# Patient Record
Sex: MALE | Race: WHITE | Employment: OTHER | URBAN - METROPOLITAN AREA
[De-identification: names, ages, dates, MRNs, and addresses within clinical notes are randomized per-mention and may not be internally consistent; named-entity substitution may affect disease eponyms.]

---

## 2017-10-13 PROCEDURE — 36415 COLL VENOUS BLD VENIPUNCTURE: CPT | Performed by: INTERNAL MEDICINE

## 2017-10-13 PROCEDURE — 83516 IMMUNOASSAY NONANTIBODY: CPT | Performed by: INTERNAL MEDICINE

## 2017-10-13 PROCEDURE — 82784 ASSAY IGA/IGD/IGG/IGM EACH: CPT | Performed by: INTERNAL MEDICINE

## 2018-01-10 PROBLEM — M67.40 GANGLION CYST: Status: ACTIVE | Noted: 2018-01-10

## 2018-02-12 PROBLEM — M67.40 GANGLION CYST: Status: RESOLVED | Noted: 2018-01-10 | Resolved: 2018-02-12

## 2020-03-02 PROBLEM — M79.10 MUSCLE SORENESS: Status: ACTIVE | Noted: 2020-03-02

## 2020-04-28 PROBLEM — M79.10 MUSCLE SORENESS: Status: RESOLVED | Noted: 2020-03-02 | Resolved: 2020-04-28

## 2020-04-28 PROBLEM — D69.2 SENILE PURPURA (HCC): Status: ACTIVE | Noted: 2020-04-28

## 2020-05-01 ENCOUNTER — APPOINTMENT (OUTPATIENT)
Dept: RADIATION ONCOLOGY | Facility: HOSPITAL | Age: 75
End: 2020-05-01
Attending: RADIOLOGY
Payer: MEDICARE

## 2020-08-25 PROBLEM — D03.4 MELANOMA IN SITU OF NECK (HCC): Status: ACTIVE | Noted: 2020-08-25

## 2021-02-18 PROBLEM — I42.9 CARDIOMYOPATHY, UNSPECIFIED TYPE (HCC): Status: ACTIVE | Noted: 2021-02-18

## 2021-02-18 PROBLEM — I49.5 SSS (SICK SINUS SYNDROME) (HCC): Status: ACTIVE | Noted: 2021-02-18

## 2021-02-18 PROBLEM — I25.119 CORONARY ARTERY DISEASE INVOLVING NATIVE CORONARY ARTERY OF NATIVE HEART WITH ANGINA PECTORIS (HCC): Status: ACTIVE | Noted: 2021-02-18

## 2021-08-12 PROBLEM — I25.119 CORONARY ARTERY DISEASE INVOLVING NATIVE CORONARY ARTERY OF NATIVE HEART WITH ANGINA PECTORIS (HCC): Status: RESOLVED | Noted: 2021-02-18 | Resolved: 2021-08-12

## 2021-11-10 ENCOUNTER — TELEPHONE (OUTPATIENT)
Dept: SURGERY | Facility: CLINIC | Age: 76
End: 2021-11-10

## 2021-11-10 ENCOUNTER — OFFICE VISIT (OUTPATIENT)
Dept: SURGERY | Facility: CLINIC | Age: 76
End: 2021-11-10
Payer: MEDICARE

## 2021-11-10 VITALS
DIASTOLIC BLOOD PRESSURE: 78 MMHG | WEIGHT: 156 LBS | SYSTOLIC BLOOD PRESSURE: 130 MMHG | HEIGHT: 70 IN | BODY MASS INDEX: 22.33 KG/M2 | HEART RATE: 60 BPM

## 2021-11-10 DIAGNOSIS — C61 PROSTATE CANCER (HCC): Primary | ICD-10-CM

## 2021-11-10 DIAGNOSIS — R79.89 LOW TESTOSTERONE: ICD-10-CM

## 2021-11-10 DIAGNOSIS — R97.21 RISING PSA FOLLOWING TREATMENT FOR MALIGNANT NEOPLASM OF PROSTATE: ICD-10-CM

## 2021-11-10 DIAGNOSIS — R35.1 NOCTURIA: ICD-10-CM

## 2021-11-10 PROCEDURE — 99205 OFFICE O/P NEW HI 60 MIN: CPT | Performed by: UROLOGY

## 2021-11-10 NOTE — PATIENT INSTRUCTIONS
Koki Valadez M.D.      1.  Please get blood draw for PSA, testosterone, and CMP (comprehensive metabolic profile) during the next few days; the CMP requires fasting at least 8 hours, but you may drink water right up

## 2021-11-10 NOTE — PROGRESS NOTES
Rukhsana Shin is a 68year old male. HPI:   Patient presents with:  Prostate Cancer: pt in for consult, pt has prostate cancer. History provided by Patient and wife Pelon Cali.  Self-referred    Prostate cancer   Stage TI C., Austin's score 3+5 adeno of radation. MRI completed on 6/26/2020 showed no evidence of metastatic disease within the pelvis. Last Lupron (1 month, right side) injection given on 10/21/2020. Last Bicalutamide Refill on 10/27/2020.  Last Eligard (22.5 mg, left side) injection given o SURGERY  04/2021    BCC removal back and cheek   • SKIN SURGERY  06/17/2021    BCC-right upper lip near alar crease, MMS   • SPECIAL SERVICE OR REPORT  May 7, 2007    Brachytherapy      Family History   Problem Relation Age of Onset   • Other (smoking) Fat bruising  Integumentary:  Negative for pruritus and rash  Musculoskeletal:  Negative for bone/joint symptoms  Psychiatric:  Negative for inappropriate interaction and psychiatric symptoms  Respiratory:  Negative for cough, dyspnea and wheezing      PHYSICA 93.99  11/18/2020 PSA = 0.650  10/15/2020 PSA = 1.27  09/14/2020 PSA = 6.69  07/09/2020 PSA = 4.68  06/06/2020 PSA = 6.36  03/12/2020 PSA = 4.62  01/23/2020 PSA = 8.40  10/22/2019 PSA = 1.77  07/23/2019 PSA = 0.353  04/22/2019 PSA = 2.86  03/25/2016 PSA = metastatic disease; prostatic radiation seeds. Patient is currently receiving Eligard Injections, Last injection 09/08/2021. Most recent  10/25/2021 PSA = 6.31 slightly decreased to 09/09/2021 PSA = 7.42.  I explain to patient and his wife that he has lik PSA, Diagnostic      Testosterone Total      Comp Metabolic Panel (14)      Meds This Visit:  Requested Prescriptions      No prescriptions requested or ordered in this encounter       Imaging & Referrals:  NM BONE SCAN WB (CPT=78306)  PET WHOLE BODY FOR P

## 2021-11-10 NOTE — TELEPHONE ENCOUNTER
Armando Tripathi,    I am seeing Pushpa Pascal for second opinion on recurrent prostate cancer, status post failed brachytherapy in the past, with rising PSA despite recent leuprolide-Lupron injection. I recommend that he seek treatment opinion from a good medical oncologist--could you refer him to a medical oncologist for an opinion? I am also recommending that he get a PET CT scan and a bone scan and my intentions are to order it through [facility].   Thanks,  Sayra Rice

## 2023-06-05 ENCOUNTER — TELEPHONE (OUTPATIENT)
Dept: HEMATOLOGY/ONCOLOGY | Facility: HOSPITAL | Age: 78
End: 2023-06-05

## 2023-06-13 ENCOUNTER — APPOINTMENT (OUTPATIENT)
Dept: HEMATOLOGY/ONCOLOGY | Facility: HOSPITAL | Age: 78
End: 2023-06-13
Attending: INTERNAL MEDICINE
Payer: MEDICARE

## 2023-06-13 ENCOUNTER — OFFICE VISIT (OUTPATIENT)
Dept: HEMATOLOGY/ONCOLOGY | Facility: HOSPITAL | Age: 78
End: 2023-06-13
Attending: STUDENT IN AN ORGANIZED HEALTH CARE EDUCATION/TRAINING PROGRAM
Payer: MEDICARE

## 2023-06-13 VITALS
TEMPERATURE: 98 F | SYSTOLIC BLOOD PRESSURE: 156 MMHG | DIASTOLIC BLOOD PRESSURE: 76 MMHG | OXYGEN SATURATION: 100 % | HEART RATE: 57 BPM | WEIGHT: 150.81 LBS | BODY MASS INDEX: 21.59 KG/M2 | RESPIRATION RATE: 18 BRPM | HEIGHT: 70 IN

## 2023-06-13 DIAGNOSIS — Z51.81 ENCOUNTER FOR MONITORING LUPRON THERAPY: ICD-10-CM

## 2023-06-13 DIAGNOSIS — Z79.818 ENCOUNTER FOR MONITORING LUPRON THERAPY: ICD-10-CM

## 2023-06-13 DIAGNOSIS — C61 MALIGNANT NEOPLASM OF PROSTATE (HCC): Primary | ICD-10-CM

## 2023-06-13 PROCEDURE — 99205 OFFICE O/P NEW HI 60 MIN: CPT | Performed by: STUDENT IN AN ORGANIZED HEALTH CARE EDUCATION/TRAINING PROGRAM

## 2023-06-13 RX ORDER — BICALUTAMIDE 50 MG/1
50 TABLET, FILM COATED ORAL DAILY
COMMUNITY
Start: 2023-04-13

## 2023-06-16 ENCOUNTER — TELEPHONE (OUTPATIENT)
Dept: HEMATOLOGY/ONCOLOGY | Facility: HOSPITAL | Age: 78
End: 2023-06-16

## 2023-06-16 DIAGNOSIS — C61 MALIGNANT NEOPLASM OF PROSTATE (HCC): Primary | ICD-10-CM

## 2023-06-16 NOTE — TELEPHONE ENCOUNTER
Called [facility] to discuss tumor board recommendations. We discussed that he should see urology to further discuss possible salvage options for locally recurrent prostate cancer after brachytherapy. We will refer him to Dr. Isela Smart who may consider cryotherapy. I discussed with the patient that he may require another biopsy to confirm locally recurrent disease. He expressed an understanding and is agreeable for consultation with Dr. Jil Castro. We will have Dr. Litzy Santizo office staff reach out to the patient for scheduling.     Ekta Henao, [address] [facility],Third Floor

## 2023-08-29 ENCOUNTER — APPOINTMENT (OUTPATIENT)
Dept: HEMATOLOGY/ONCOLOGY | Facility: HOSPITAL | Age: 78
End: 2023-08-29
Payer: MEDICARE